# Patient Record
Sex: MALE
[De-identification: names, ages, dates, MRNs, and addresses within clinical notes are randomized per-mention and may not be internally consistent; named-entity substitution may affect disease eponyms.]

---

## 2024-03-07 ENCOUNTER — APPOINTMENT (OUTPATIENT)
Dept: PULMONOLOGY | Facility: CLINIC | Age: 39
End: 2024-03-07
Payer: MEDICAID

## 2024-03-07 VITALS
TEMPERATURE: 97.9 F | DIASTOLIC BLOOD PRESSURE: 70 MMHG | HEART RATE: 70 BPM | OXYGEN SATURATION: 97 % | HEIGHT: 72 IN | BODY MASS INDEX: 24.11 KG/M2 | WEIGHT: 178 LBS | SYSTOLIC BLOOD PRESSURE: 112 MMHG

## 2024-03-07 DIAGNOSIS — Z78.9 OTHER SPECIFIED HEALTH STATUS: ICD-10-CM

## 2024-03-07 DIAGNOSIS — R06.09 OTHER FORMS OF DYSPNEA: ICD-10-CM

## 2024-03-07 DIAGNOSIS — Z87.891 PERSONAL HISTORY OF NICOTINE DEPENDENCE: ICD-10-CM

## 2024-03-07 DIAGNOSIS — Z84.89 FAMILY HISTORY OF OTHER SPECIFIED CONDITIONS: ICD-10-CM

## 2024-03-07 PROBLEM — Z00.00 ENCOUNTER FOR PREVENTIVE HEALTH EXAMINATION: Status: ACTIVE | Noted: 2024-03-07

## 2024-03-07 PROCEDURE — ZZZZZ: CPT

## 2024-03-07 PROCEDURE — 99203 OFFICE O/P NEW LOW 30 MIN: CPT | Mod: 25

## 2024-03-07 PROCEDURE — 94726 PLETHYSMOGRAPHY LUNG VOLUMES: CPT | Mod: 26

## 2024-03-07 PROCEDURE — 94729 DIFFUSING CAPACITY: CPT | Mod: 26

## 2024-03-07 PROCEDURE — 94060 EVALUATION OF WHEEZING: CPT | Mod: 26

## 2024-03-07 NOTE — HISTORY OF PRESENT ILLNESS
[TextBox_4] : Initial visit for this 38-year-old gentleman who has been smoking at least a pack per day for nearly 30 years.  He quit over the last month, using Chantix for a few weeks.  He is no longer on this or any other medication.  He has noticed over the past year mild dyspnea on exertion, for example climbing 3 flights of stairs.  When he smoked he also noted a productive morning cough.  He tells me he has had a chest x-ray which was negative at [facility].  He has no other medical history and he has no allergies.  There is no family history of lung disease other than what he tells me was pleurisy in his mother at some point.

## 2024-03-07 NOTE — PROCEDURE
[FreeTextEntry1] : Pulmonary function testing done today shows FEV1 4.33 L, 96% predicted, spirometry normal.  There is a small response to inhaled bronchodilators.  Lung volumes are normal.  Diffusion capacity is normal when corrected for alveolar volume.

## 2024-03-07 NOTE — PHYSICAL EXAM
[No Acute Distress] : no acute distress [Normal Oropharynx] : normal oropharynx [Normal Appearance] : normal appearance [Normal Rate/Rhythm] : normal rate/rhythm [No Neck Mass] : no neck mass [No Resp Distress] : no resp distress [No Murmurs] : no murmurs [Normal S1, S2] : normal s1, s2 [Clear to Auscultation Bilaterally] : clear to auscultation bilaterally [No Abnormalities] : no abnormalities [Benign] : benign [No Clubbing] : no clubbing [Normal Gait] : normal gait [No Cyanosis] : no cyanosis [No Edema] : no edema [FROM] : FROM [Normal Color/ Pigmentation] : normal color/ pigmentation [No Focal Deficits] : no focal deficits [Oriented x3] : oriented x3 [Normal Affect] : normal affect

## 2024-03-07 NOTE — ASSESSMENT
[FreeTextEntry1] : No evidence of significant pulmonary disease.  I have congratulated him on quitting smoking, but warned him that he is at high risk for starting again for at least the next several months.  As long as he refrains from smoking again I think he is at no risk of significant pulmonary disease.

## 2024-03-07 NOTE — CONSULT LETTER
[Dear  ___] : Dear  [unfilled], [Consult Letter:] : I had the pleasure of evaluating your patient, [unfilled]. [Consult Closing:] : Thank you very much for allowing me to participate in the care of this patient.  If you have any questions, please do not hesitate to contact me. [Sincerely,] : Sincerely, [FreeTextEntry2] : Zin Win

## 2024-03-11 ENCOUNTER — TRANSCRIPTION ENCOUNTER (OUTPATIENT)
Age: 39
End: 2024-03-11